# Patient Record
Sex: FEMALE | Race: WHITE | NOT HISPANIC OR LATINO | Employment: PART TIME | URBAN - METROPOLITAN AREA
[De-identification: names, ages, dates, MRNs, and addresses within clinical notes are randomized per-mention and may not be internally consistent; named-entity substitution may affect disease eponyms.]

---

## 2020-05-22 ENCOUNTER — HOSPITAL ENCOUNTER (OUTPATIENT)
Dept: OTHER | Facility: HOSPITAL | Age: 35
Discharge: HOME | End: 2020-05-22
Attending: OBSTETRICS & GYNECOLOGY

## 2023-08-29 ENCOUNTER — OFFICE VISIT (OUTPATIENT)
Dept: OTOLARYNGOLOGY | Facility: CLINIC | Age: 38
End: 2023-08-29
Payer: COMMERCIAL

## 2023-08-29 VITALS — WEIGHT: 158.4 LBS | BODY MASS INDEX: 27.04 KG/M2 | HEIGHT: 64 IN | TEMPERATURE: 97.4 F

## 2023-08-29 DIAGNOSIS — H90.0 CONDUCTIVE HEARING LOSS, BILATERAL: ICD-10-CM

## 2023-08-29 DIAGNOSIS — H80.93 OTOSCLEROSIS OF BOTH EARS: Primary | ICD-10-CM

## 2023-08-29 PROCEDURE — 99204 OFFICE O/P NEW MOD 45 MIN: CPT | Performed by: OTOLARYNGOLOGY

## 2023-08-29 NOTE — PROGRESS NOTES
Patient Name: Kaylan Morris   Visit Date: 2023   Patient ID: [id number]  Provider: Navneet Martinez MD    Sex: female  Location: [facility]   YOB: 1985  Location Address: [address] [place] [address], [address],?[place]?[id number]    Primary Care Provider Provider, No Known  Location Phone: [phone]    Referring Provider: No ref. provider found        Chief Complaint  Hearing Loss (New patient )    Subjective    History of Present Illness  Kaylan Morris is a 38 y.o. female who presents to [facility] today as a consult from No ref. provider found.    She presents to the clinic today for evaluation of longstanding progressive hearing loss that she has noted now for several years.  She does have difficulty in day-to-day situations and recently had her hearing tested and was referred here for further evaluation by the audiologist.  She denies any history of recurrent ear disease, and has not had any loud noise exposure.  She denies any family history of hearing loss at an early age.    Hearing test was reviewed and reveals bilateral conductive hearing loss with pattern consistent with otosclerosis slightly worse in the left ear.  She is a good candidate for hearing aids and has very good word discrimination scores on both sides.  Conductive component is between 10 and 30 dB depending on the frequency.    Past Medical History:   Diagnosis Date    HL (hearing loss)        Past Surgical History:   Procedure Laterality Date     SECTION         No current outpatient medications on file.     No Known Allergies    Family History   Problem Relation Age of Onset    Diabetes Paternal Aunt     Diabetes Paternal Uncle     Cancer Maternal Grandmother     Diabetes Maternal Grandmother     Cancer Paternal Grandmother         Social History     Social History Narrative    Not on file       Objective     Vital Signs:   Temp 97.4 øF (36.3 øC)  "(Tympanic)   Ht 162.6 cm (64\")   Wt 71.8 kg (158 lb 6.4 oz)   BMI 27.19 kg/mý       Physical Exam         Constitutional   Appearance  : well developed, well-nourished, alert and in no acute distress, voice clear and strong    Head  Inspection  : no deformities or lesions  Face  Inspection  : No facial lesions; House-Brackmann I/VI bilaterally  Palpation  : No TMJ crepitus nor  muscle tenderness bilaterally    Eyes  Vision  Visual Fields  : Extraocular movements are intact. No spontaneous or gaze-induced nystagmus.  Conjunctivae  : clear  Sclerae  : clear  Pupils and Irises  : pupils equal, round, and reactive to light.     Ears, Nose, Mouth and Throat    Ears    External Ears  : appearance within normal limits, no lesions present  Otoscopic Examination  : Tympanic membrane appearance within normal limits bilaterally without perforations, well-aerated middle ears  Hearing  : intact to conversational voice both ears  Tunning fork testing:     :    Nose    External Nose  : appearance normal  Intranasal Exam  : mucosa within normal limits, vestibules normal, no intranasal lesions present, septum midline, sinuses non tender to percussion  Oral Cavity    Oral Mucosa  : oral mucosa normal without pallor or cyanosis  Lips  : lip appearance normal  Teeth  : normal dentition for age  Gums  : gums pink, non-swollen, no bleeding present  Tongue  : tongue appearance normal; normal mobility  Palate  : hard palate normal, soft palate appearance normal with symmetric mobility    Throat    Oropharynx  : no inflammation or lesions present, tonsils within normal limits  Hypopharynx  : appearance within normal limits, superior epiglottis within normal limits  Larynx  : appearance within normal limits, vocal cords within normal limits, no lesions present    Neck  Inspection/Palpation  : normal appearance, no masses or tenderness, trachea midline; thyroid size normal, nontender, no nodules or masses present on " palpation    Respiratory  Respiratory Effort  : breathing unlabored  Inspection of Chest  : normal appearance, no retractions    Cardiovascular  Heart  : regular rate and rhythm    Lymphatic  Neck  : no lymphadenopathy present  Supraclavicular Nodes  : no lymphadenopathy present  Preauricular Nodes  : no lymphadenopathy present    Skin and Subcutaneous Tissue  General Inspection  : Regarding face and neck - there are no rashes present, no lesions present, and no areas of discoloration    Neurologic  Cranial Nerves  : cranial nerves II-XII are grossly intact bilaterally  Gait and Station  : normal gait, able to stand without diffculty    Psychiatric  Judgement and Insight  : judgment and insight intact  Mood and Affect  : mood normal, affect appropriate          Assessment and Plan    Diagnoses and all orders for this visit:    1. Otosclerosis of both ears (Primary)    2. Conductive hearing loss, bilateral    Ear examination today was completely normal.  I discussed her audiogram results which revealed bilateral conductive hearing loss slightly worse in the left ear consistent with otosclerosis.  Treatment options were discussed including hearing aid trial, and we did discuss stapedectomy as an option either now or down the road should she have progressive hearing loss.  She will discuss hearing aids with the audiologist and will contact me should there be any issues or progressive loss on future hearing test.    Follow Up   No follow-ups on file.  Patient was given instructions and counseling regarding her condition or for health maintenance advice. Please see specific information pulled into the AVS if appropriate.

## 2023-08-30 ENCOUNTER — PATIENT ROUNDING (BHMG ONLY) (OUTPATIENT)
Dept: OTOLARYNGOLOGY | Facility: CLINIC | Age: 38
End: 2023-08-30
Payer: COMMERCIAL

## 2023-08-30 NOTE — PROGRESS NOTES
A [employer] message has been send to the patient for PATIENT ROUNDING with [facility].

## 2024-10-01 ENCOUNTER — OFFICE VISIT (OUTPATIENT)
Dept: SURGERY | Facility: CLINIC | Age: 39
End: 2024-10-01
Payer: COMMERCIAL

## 2024-10-01 VITALS
HEIGHT: 64 IN | WEIGHT: 194 LBS | SYSTOLIC BLOOD PRESSURE: 118 MMHG | HEART RATE: 62 BPM | BODY MASS INDEX: 33.12 KG/M2 | DIASTOLIC BLOOD PRESSURE: 65 MMHG

## 2024-10-01 DIAGNOSIS — Z53.21 PATIENT LEFT WITHOUT BEING SEEN: Primary | ICD-10-CM

## 2024-10-01 RX ORDER — BUPROPION HYDROCHLORIDE 300 MG/1
300 TABLET ORAL DAILY
COMMUNITY
Start: 2024-07-01

## 2024-10-01 NOTE — PROGRESS NOTES
"Chief Complaint: Colonoscopy    Subjective         History of Present Illness  Kaylan Morris is a 39 y.o. female presents to [facility] for evaluation.  she  (or use Not[employer])    Objective     Past Medical History:   Diagnosis Date    HL (hearing loss)        Past Surgical History:   Procedure Laterality Date     SECTION         Outpatient Medications Marked as Taking for the 10/1/24 encounter (Office Visit) with Nanette Hager JIA   Medication Sig Dispense Refill    buPROPion XL (WELLBUTRIN XL) 300 MG 24 hr tablet Take 1 tablet by mouth Daily.         No Known Allergies     Family History   Problem Relation Age of Onset    Diabetes Paternal Aunt     Diabetes Paternal Uncle     Cancer Maternal Grandmother         Colon cancer    Diabetes Maternal Grandmother     Heart disease Maternal Grandmother     Cancer Paternal Grandmother         Breast cancer    Cancer Maternal Aunt         Ovarian cancer    Diabetes Maternal Aunt     Diabetes Maternal Uncle        Social History     Socioeconomic History    Marital status:    Tobacco Use    Smoking status: Never     Passive exposure: Never    Smokeless tobacco: Never   Vaping Use    Vaping status: Never Used   Substance and Sexual Activity    Alcohol use: Not Currently     Comment: Dont drink regularly - always been less than a drink a week    Drug use: Never    Sexual activity: Yes     Partners: Male     Birth control/protection: Tubal ligation       Review of Systems     Vital Signs:   /65 (BP Location: Right arm, Patient Position: Sitting, Cuff Size: Adult)   Pulse 62   Ht 162.6 cm (64\")   Wt 88 kg (194 lb)   BMI 33.30 kg/m²      Physical Exam     Result Review :              []  Laboratory  []  Radiology  []  Pathology  []  Microbiology  []  EKG/Telemetry   []  Cardiology/Vascular   []  Old records       Assessment and Plan    There are no diagnoses linked to this encounter.    Follow Up   No follow-ups on " file.  Patient was given instructions and counseling regarding her condition or for health maintenance advice. Please see specific information pulled into the AVS if appropriate.

## 2024-10-02 NOTE — PROGRESS NOTES
"Chief Complaint  Colonoscopy and Left Without Being Seen    Subjective        Kaylan Morris presents to [facility]  History of Present Illness    Objective   Vital Signs:  /65 (BP Location: Right arm, Patient Position: Sitting, Cuff Size: Adult)   Pulse 62   Ht 162.6 cm (64\")   Wt 88 kg (194 lb)   BMI 33.30 kg/m²   Estimated body mass index is 33.3 kg/m² as calculated from the following:    Height as of this encounter: 162.6 cm (64\").    Weight as of this encounter: 88 kg (194 lb).            Physical Exam   Result Review :                Assessment and Plan   Diagnoses and all orders for this visit:    1. Patient left without being seen (Primary)             Follow Up   No follow-ups on file.  Patient was given instructions and counseling regarding her condition or for health maintenance advice. Please see specific information pulled into the AVS if appropriate.           The patient left the office before care was provided and did not complete the visit  because she is not old enough for a screening colonoscopy. A recall has been placed in her chart when it is time to follow up.  .   "

## 2025-02-26 PROCEDURE — 87088 URINE BACTERIA CULTURE: CPT

## 2025-02-26 PROCEDURE — 87186 SC STD MICRODIL/AGAR DIL: CPT

## 2025-02-26 PROCEDURE — 87086 URINE CULTURE/COLONY COUNT: CPT

## 2025-03-01 ENCOUNTER — TELEPHONE (OUTPATIENT)
Dept: URGENT CARE | Facility: CLINIC | Age: 40
End: 2025-03-01
Payer: COMMERCIAL

## 2025-03-12 PROCEDURE — 87086 URINE CULTURE/COLONY COUNT: CPT

## 2025-03-12 PROCEDURE — 87510 GARDNER VAG DNA DIR PROBE: CPT

## 2025-03-12 PROCEDURE — 87480 CANDIDA DNA DIR PROBE: CPT

## 2025-03-12 PROCEDURE — 87660 TRICHOMONAS VAGIN DIR PROBE: CPT
